# Patient Record
Sex: FEMALE | Race: WHITE | URBAN - NONMETROPOLITAN AREA
[De-identification: names, ages, dates, MRNs, and addresses within clinical notes are randomized per-mention and may not be internally consistent; named-entity substitution may affect disease eponyms.]

---

## 2023-01-26 ENCOUNTER — OFFICE VISIT (OUTPATIENT)
Dept: URBAN - NONMETROPOLITAN AREA CLINIC 14 | Facility: CLINIC | Age: 68
End: 2023-01-26
Payer: MEDICARE

## 2023-01-26 DIAGNOSIS — T15.12XA FOREIGN BODY IN CONJUNCTIVAL SAC, LEFT EYE, INITIAL ENCOUNTER: Primary | ICD-10-CM

## 2023-01-26 PROCEDURE — 65205 REMOVE FOREIGN BODY FROM EYE: CPT | Performed by: OPTOMETRIST

## 2023-01-26 RX ORDER — OFLOXACIN 3 MG/ML
0.3 % SOLUTION/ DROPS OPHTHALMIC
Qty: 5 | Refills: 0 | Status: ACTIVE
Start: 2023-01-26

## 2023-01-26 ASSESSMENT — INTRAOCULAR PRESSURE
OS: 21
OD: 17

## 2023-01-26 NOTE — IMPRESSION/PLAN
Impression: Foreign body in conjunctival sac, left eye, initial encounter: T15.12XA. Plan: Removed CL in office w/o complications. Start oflox QID OS. RTC PRN with increased symptoms.

## 2023-03-01 ENCOUNTER — OFFICE VISIT (OUTPATIENT)
Dept: URBAN - NONMETROPOLITAN AREA CLINIC 14 | Facility: CLINIC | Age: 68
End: 2023-03-01
Payer: MEDICARE

## 2023-03-01 DIAGNOSIS — T15.12XA FOREIGN BODY IN CONJUNCTIVAL SAC, LEFT EYE, INITIAL ENCOUNTER: Primary | ICD-10-CM

## 2023-03-01 PROCEDURE — 65205 REMOVE FOREIGN BODY FROM EYE: CPT | Performed by: OPTOMETRIST

## 2023-03-01 ASSESSMENT — INTRAOCULAR PRESSURE
OS: 20
OD: 17

## 2023-03-01 NOTE — IMPRESSION/PLAN
Impression: Foreign body in conjunctival sac, left eye, initial encounter: T15.12XA. Plan: Removed broken CL in office w/o complications. RTC PRN with increased symptoms.

## 2023-09-19 ENCOUNTER — OFFICE VISIT (OUTPATIENT)
Dept: URBAN - NONMETROPOLITAN AREA CLINIC 14 | Facility: CLINIC | Age: 68
End: 2023-09-19
Payer: MEDICARE

## 2023-09-19 DIAGNOSIS — H00.015 HORDEOLUM EXTERNUM LEFT LOWER EYELID: Primary | ICD-10-CM

## 2023-09-19 PROCEDURE — 99214 OFFICE O/P EST MOD 30 MIN: CPT | Performed by: OPTOMETRIST

## 2023-09-19 RX ORDER — CEPHALEXIN 500 MG/1
500 MG CAPSULE ORAL
Qty: 21 | Refills: 0 | Status: ACTIVE
Start: 2023-09-19

## 2023-09-19 RX ORDER — FLUCONAZOLE 200 MG/1
200 MG TABLET ORAL
Qty: 2 | Refills: 0 | Status: ACTIVE
Start: 2023-09-19

## 2023-09-19 RX ORDER — NEOMYCIN SULFATE, POLYMYXIN B SULFATE AND DEXAMETHASONE 3.5; 10000; 1 MG/ML; [USP'U]/ML; MG/ML
SUSPENSION/ DROPS OPHTHALMIC
Qty: 5 | Refills: 0 | Status: ACTIVE
Start: 2023-09-19

## 2023-09-19 ASSESSMENT — INTRAOCULAR PRESSURE
OD: 16
OS: 20